# Patient Record
Sex: FEMALE | Race: WHITE | Employment: UNEMPLOYED | URBAN - METROPOLITAN AREA
[De-identification: names, ages, dates, MRNs, and addresses within clinical notes are randomized per-mention and may not be internally consistent; named-entity substitution may affect disease eponyms.]

---
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
## 2025-01-06 ENCOUNTER — OFFICE VISIT (OUTPATIENT)
Dept: FAMILY MEDICINE CLINIC | Age: 20
End: 2025-01-06
Payer: COMMERCIAL

## 2025-01-06 VITALS
DIASTOLIC BLOOD PRESSURE: 78 MMHG | BODY MASS INDEX: 29.78 KG/M2 | HEIGHT: 70 IN | WEIGHT: 208 LBS | TEMPERATURE: 98.6 F | SYSTOLIC BLOOD PRESSURE: 118 MMHG | HEART RATE: 118 BPM | OXYGEN SATURATION: 98 % | RESPIRATION RATE: 18 BRPM

## 2025-01-06 DIAGNOSIS — R10.32 LLQ ABDOMINAL PAIN: Primary | ICD-10-CM

## 2025-01-06 DIAGNOSIS — R30.0 DYSURIA: ICD-10-CM

## 2025-01-06 DIAGNOSIS — E86.0 DEHYDRATION: ICD-10-CM

## 2025-01-06 LAB
BILIRUBIN, POC: NORMAL
BLOOD URINE, POC: NORMAL
CLARITY, POC: CLEAR
COLOR, POC: YELLOW
CONTROL: NORMAL
GLUCOSE URINE, POC: NORMAL MG/DL
KETONES, POC: NORMAL MG/DL
LEUKOCYTE EST, POC: NORMAL
NITRITE, POC: NORMAL
PH, POC: 6
PREGNANCY TEST URINE, POC: NEGATIVE
PROTEIN, POC: 30 MG/DL
SPECIFIC GRAVITY, POC: >=1.03
UROBILINOGEN, POC: 0.2 MG/DL

## 2025-01-06 PROCEDURE — 1036F TOBACCO NON-USER: CPT | Performed by: NURSE PRACTITIONER

## 2025-01-06 PROCEDURE — 81025 URINE PREGNANCY TEST: CPT | Performed by: NURSE PRACTITIONER

## 2025-01-06 PROCEDURE — 99214 OFFICE O/P EST MOD 30 MIN: CPT | Performed by: NURSE PRACTITIONER

## 2025-01-06 PROCEDURE — G8427 DOCREV CUR MEDS BY ELIG CLIN: HCPCS | Performed by: NURSE PRACTITIONER

## 2025-01-06 PROCEDURE — 81002 URINALYSIS NONAUTO W/O SCOPE: CPT | Performed by: NURSE PRACTITIONER

## 2025-01-06 PROCEDURE — G8417 CALC BMI ABV UP PARAM F/U: HCPCS | Performed by: NURSE PRACTITIONER

## 2025-01-06 NOTE — PROGRESS NOTES
ACUTE PRIMARY CARE VISIT  25  Name: Makayla Jimenez   : 2005   Age: 19 y.o.  Sex: female   Chief Complaint   Patient presents with    GI Problem     HPI:     History of Present Illness  The patient is a 19-year-old female who presents for evaluation of gastrointestinal issues.    She reports experiencing difficulty with bowel movements, a lack of appetite, and persistent fatigue. She describes sharp abdominal pain followed by a sensation of gurgling in her back, typically resulting in the passage of gas during bathroom visits. These symptoms have been present for approximately one week, with a notable increase in severity yesterday. She has not been eating or drinking adequately and reports dark urine but no associated burning sensation. She has not taken any medication for her symptoms and has been managing them by resting and sleeping. She also reports tachycardia and tingling sensations. She does not experience nausea or vomiting. She still has her gallbladder and appendix. She has no history of ulcerative colitis or Crohn's disease. She experienced hot and cold sensations yesterday. She reports no possibility of pregnancy and is currently on birth control pills. She does not menstruate regularly.  Objective:     Vitals:    25 0845   BP: 118/78   Pulse: (!) 118   Resp: 18   Temp: 98.6 °F (37 °C)   SpO2: 98%   Weight: 94.3 kg (208 lb)   Height: 1.778 m (5' 10\")     Physical Exam  Active bowel sounds in the gastrointestinal system. Pain noted upon palpation.  Physical Exam  Vitals and nursing note reviewed.   Constitutional:       General: She is not in acute distress.     Appearance: Normal appearance. She is not ill-appearing.   HENT:      Head: Normocephalic and atraumatic.      Right Ear: External ear normal.      Left Ear: External ear normal.      Nose: Nose normal.   Eyes:      Conjunctiva/sclera: Conjunctivae normal.      Pupils: Pupils are equal, round, and reactive to light.

## 2025-01-08 LAB
CULTURE: NORMAL
SPECIMEN DESCRIPTION: NORMAL

## 2025-01-15 ENCOUNTER — TELEPHONE (OUTPATIENT)
Dept: CARDIOLOGY CLINIC | Age: 20
End: 2025-01-15

## 2025-01-15 NOTE — TELEPHONE ENCOUNTER
Patient Appointment Form:   scheduled from referral        PCP: Dr Osborne  Referring: Dr De La Garza     Has the Patient:     Seen a Cardiologist? Yes    date: 2022  Physician: DR Vivas  location: [facility]     Had a heart catheterization? No      Had heart surgery? No     Had a stress test or nuclear stress test? Yes   date: 2021   facility name: [facility]     Had an echocardiogram? Yes   date: 2021   facility name: [facility]    Had a vascular ultrasound? Yes     Had a 24/48 heart monitor or extended cardiac event monitor? Yes: extended cardiac event/MCOT monitor, other    date: 2021      Who ordered: [facility]     Had recent blood work in the last 6 months? No       Had a pacemaker/ICD/ILR implant? No     Seen an Electrophysiologist? No      Had a cardiac ablation?  No       Will send records via: in Epic        Date & time of appointment:  3/12/25 10:45am Dr Albarran

## 2025-01-29 ENCOUNTER — TELEPHONE (OUTPATIENT)
Dept: CARDIOLOGY CLINIC | Age: 20
End: 2025-01-29

## 2025-01-29 NOTE — TELEPHONE ENCOUNTER
Pt's grandmother called in she wanted to know if the pt can be seen sooner than 3/12/25, she went to [facility] on 1/28/25 her heart rate was 180, ED advised her to call to see if can be seen sooner.  Sarah can be reached at [phone].

## 2025-02-13 ENCOUNTER — OFFICE VISIT (OUTPATIENT)
Dept: CARDIOLOGY CLINIC | Age: 20
End: 2025-02-13
Payer: COMMERCIAL

## 2025-02-13 VITALS
RESPIRATION RATE: 16 BRPM | TEMPERATURE: 98.4 F | BODY MASS INDEX: 30.21 KG/M2 | HEIGHT: 70 IN | OXYGEN SATURATION: 98 % | HEART RATE: 98 BPM | DIASTOLIC BLOOD PRESSURE: 68 MMHG | SYSTOLIC BLOOD PRESSURE: 116 MMHG | WEIGHT: 211 LBS

## 2025-02-13 DIAGNOSIS — R00.0 TACHYCARDIA: Primary | ICD-10-CM

## 2025-02-13 DIAGNOSIS — R00.2 PALPITATIONS: ICD-10-CM

## 2025-02-13 DIAGNOSIS — I95.1 ORTHOSTATIC INTOLERANCE: ICD-10-CM

## 2025-02-13 PROCEDURE — 93000 ELECTROCARDIOGRAM COMPLETE: CPT | Performed by: INTERNAL MEDICINE

## 2025-02-13 PROCEDURE — 1036F TOBACCO NON-USER: CPT | Performed by: INTERNAL MEDICINE

## 2025-02-13 PROCEDURE — 99204 OFFICE O/P NEW MOD 45 MIN: CPT | Performed by: INTERNAL MEDICINE

## 2025-02-13 PROCEDURE — G8417 CALC BMI ABV UP PARAM F/U: HCPCS | Performed by: INTERNAL MEDICINE

## 2025-02-13 PROCEDURE — G8427 DOCREV CUR MEDS BY ELIG CLIN: HCPCS | Performed by: INTERNAL MEDICINE

## 2025-02-13 NOTE — PROGRESS NOTES
Patient was seen today for a heart monitor placement ordered by Dr. BOSWELL    Monitor type:ZIO   Duration:7 Days   Serial number: [id number]    ERMIAS RAMIREZ MA

## 2025-02-13 NOTE — PROGRESS NOTES
OUTPATIENT CARDIOLOGY CONSULT    Name: Makayla Jimenez    Age: 19 y.o.    Date of Service: 2/13/2025    Reason for Consultation: Palpitations    Referring Physician: Destiny Osborne MD    History of Present Illness:  Makayla Jimenez is a 19 y.o. female who presents today for further evaluation of palpitations.  States history of a heart condition but \"no one has been able to find what it is.\"  Multiple cardiac evaluations including pediatric cardiology with multiple monitors in the past with no identified arrhythmias.  Apparently had a syncopal episode while playing basketball and 2022 was taken to [facility], was thought to have possibly had a panic attack according to ER notes.     Initially scheduled cardiology valuation for clearance for knee surgery, then in January went to [place] ER and reportedly heart rates were in the 170s to 180s, she was diagnosed with influenza A and was febrile.  She was given IV fluids.  No records are available to me.  No EKG is available.  It does not appear she was told she had an arrhythmia.    Notices heart rates is elevated mainly when she stands.  Sometimes some dizziness upon standing but resolved.  No further syncope.  Denies chest pain or shortness of breath.  She works 2 jobs.  Works as a  and at a .    Does not drink much water.  Does not drink caffeine or alcohol or use drugs.    Had a workup at [facility] in 2021, thought they were told something about a \"extra chamber\" but that they wanted to do a CT of the heart but she is allergic to contrast.    Review of Systems:  Complete review of systems otherwise negative except as described above.    Past Medical History:  Past Medical History:   Diagnosis Date    Migraine     Tachycardia        Past Surgical History:  Past Surgical History:   Procedure Laterality Date    ANTERIOR CRUCIATE LIGAMENT REPAIR      EYE SURGERY Bilateral     KNEE SURGERY Right 08/2021    TONSILLECTOMY Bilateral

## 2025-02-14 ENCOUNTER — TELEPHONE (OUTPATIENT)
Dept: CARDIOLOGY | Age: 20
End: 2025-02-14

## 2025-02-14 ENCOUNTER — TELEPHONE (OUTPATIENT)
Dept: CARDIOLOGY CLINIC | Age: 20
End: 2025-02-14

## 2025-02-14 DIAGNOSIS — I95.1 ORTHOSTATIC INTOLERANCE: Primary | ICD-10-CM

## 2025-02-14 NOTE — TELEPHONE ENCOUNTER
----- Message from Dr. Nithin Sequeira MD sent at 2/13/2025  4:12 PM EST -----  needs tilt table test

## 2025-02-14 NOTE — TELEPHONE ENCOUNTER
Called patient and left message to schedule echo    Electronically signed by Irma Casarez on 2/14/2025 at 8:52 AM

## 2025-02-17 DIAGNOSIS — R00.0 TACHYCARDIA: ICD-10-CM

## 2025-02-17 LAB
ANION GAP SERPL CALCULATED.3IONS-SCNC: 13 MMOL/L (ref 7–16)
BUN BLDV-MCNC: 14 MG/DL (ref 6–20)
CALCIUM SERPL-MCNC: 9.9 MG/DL (ref 8.6–10.2)
CHLORIDE BLD-SCNC: 106 MMOL/L (ref 98–107)
CO2: 23 MMOL/L (ref 22–29)
CREAT SERPL-MCNC: 1.2 MG/DL (ref 0.5–1)
GFR, ESTIMATED: 68 ML/MIN/1.73M2
GLUCOSE BLD-MCNC: 124 MG/DL (ref 74–99)
POTASSIUM SERPL-SCNC: 4.6 MMOL/L (ref 3.5–5)
SODIUM BLD-SCNC: 142 MMOL/L (ref 132–146)
T4 FREE: 1.1 NG/DL (ref 0.9–1.7)
TSH SERPL DL<=0.05 MIU/L-ACNC: 0.99 UIU/ML (ref 0.27–4.2)

## 2025-03-04 ENCOUNTER — HOSPITAL ENCOUNTER (OUTPATIENT)
Dept: CARDIOLOGY | Age: 20
Discharge: HOME | End: 2025-03-06
Attending: INTERNAL MEDICINE
Payer: COMMERCIAL

## 2025-03-04 VITALS
HEIGHT: 70 IN | WEIGHT: 211 LBS | BODY MASS INDEX: 30.21 KG/M2 | SYSTOLIC BLOOD PRESSURE: 116 MMHG | DIASTOLIC BLOOD PRESSURE: 68 MMHG

## 2025-03-04 DIAGNOSIS — R00.2 PALPITATIONS: ICD-10-CM

## 2025-03-04 PROCEDURE — 93306 TTE W/DOPPLER COMPLETE: CPT

## 2025-03-05 ENCOUNTER — TELEPHONE (OUTPATIENT)
Dept: CARDIOLOGY CLINIC | Age: 20
End: 2025-03-05

## 2025-03-05 DIAGNOSIS — R00.2 PALPITATIONS: ICD-10-CM

## 2025-03-05 LAB
ECHO AO ASC DIAM: 3.2 CM
ECHO AO ASCENDING AORTA INDEX: 1.5 CM/M2
ECHO AV AREA PEAK VELOCITY: 3.3 CM2
ECHO AV AREA VTI: 3.1 CM2
ECHO AV AREA/BSA PEAK VELOCITY: 1.5 CM2/M2
ECHO AV AREA/BSA VTI: 1.4 CM2/M2
ECHO AV CUSP MM: 2.1 CM
ECHO AV MEAN GRADIENT: 3 MMHG
ECHO AV MEAN VELOCITY: 0.8 M/S
ECHO AV PEAK GRADIENT: 5 MMHG
ECHO AV PEAK VELOCITY: 1.1 M/S
ECHO AV VELOCITY RATIO: 0.91
ECHO AV VTI: 24.6 CM
ECHO BSA: 2.17 M2
ECHO EST RA PRESSURE: 3 MMHG
ECHO LA DIAMETER INDEX: 1.73 CM/M2
ECHO LA DIAMETER: 3.7 CM
ECHO LA VOL A-L A2C: 42 ML (ref 22–52)
ECHO LA VOL A-L A4C: 43 ML (ref 22–52)
ECHO LA VOL MOD A2C: 39 ML (ref 22–52)
ECHO LA VOL MOD A4C: 39 ML (ref 22–52)
ECHO LA VOLUME AREA LENGTH: 45 ML
ECHO LA VOLUME INDEX A-L A2C: 20 ML/M2 (ref 16–34)
ECHO LA VOLUME INDEX A-L A4C: 20 ML/M2 (ref 16–34)
ECHO LA VOLUME INDEX AREA LENGTH: 21 ML/M2 (ref 16–34)
ECHO LA VOLUME INDEX MOD A2C: 18 ML/M2 (ref 16–34)
ECHO LA VOLUME INDEX MOD A4C: 18 ML/M2 (ref 16–34)
ECHO LV EF PHYSICIAN: 60 %
ECHO LV FRACTIONAL SHORTENING: 38 % (ref 28–44)
ECHO LV INTERNAL DIMENSION DIASTOLE INDEX: 2.24 CM/M2
ECHO LV INTERNAL DIMENSION DIASTOLIC: 4.8 CM (ref 3.9–5.3)
ECHO LV INTERNAL DIMENSION SYSTOLIC INDEX: 1.4 CM/M2
ECHO LV INTERNAL DIMENSION SYSTOLIC: 3 CM
ECHO LV ISOVOLUMETRIC RELAXATION TIME (IVRT): 69.2 MS
ECHO LV IVSD: 0.9 CM (ref 0.6–0.9)
ECHO LV IVSS: 1.4 CM
ECHO LV MASS 2D: 170.2 G (ref 67–162)
ECHO LV MASS INDEX 2D: 79.5 G/M2 (ref 43–95)
ECHO LV POSTERIOR WALL DIASTOLIC: 1.1 CM (ref 0.6–0.9)
ECHO LV POSTERIOR WALL SYSTOLIC: 1.6 CM
ECHO LV RELATIVE WALL THICKNESS RATIO: 0.46
ECHO LVOT AREA: 3.8 CM2
ECHO LVOT AV VTI INDEX: 0.8
ECHO LVOT DIAM: 2.2 CM
ECHO LVOT MEAN GRADIENT: 2 MMHG
ECHO LVOT PEAK GRADIENT: 4 MMHG
ECHO LVOT PEAK VELOCITY: 1 M/S
ECHO LVOT STROKE VOLUME INDEX: 35 ML/M2
ECHO LVOT SV: 74.8 ML
ECHO LVOT VTI: 19.7 CM
ECHO MV "A" WAVE DURATION: 87.7 MSEC
ECHO MV A VELOCITY: 0.35 M/S
ECHO MV AREA PHT: 2.9 CM2
ECHO MV AREA VTI: 3.1 CM2
ECHO MV E DECELERATION TIME (DT): 211.6 MS
ECHO MV E VELOCITY: 0.85 M/S
ECHO MV E/A RATIO: 2.43
ECHO MV LVOT VTI INDEX: 1.23
ECHO MV MAX VELOCITY: 0.9 M/S
ECHO MV MEAN GRADIENT: 1 MMHG
ECHO MV MEAN VELOCITY: 0.5 M/S
ECHO MV PEAK GRADIENT: 3 MMHG
ECHO MV PRESSURE HALF TIME (PHT): 75.2 MS
ECHO MV VTI: 24.3 CM
ECHO PULMONARY ARTERY END DIASTOLIC PRESSURE: 2 MMHG
ECHO PV MAX VELOCITY: 0.9 M/S
ECHO PV MEAN GRADIENT: 2 MMHG
ECHO PV MEAN VELOCITY: 0.7 M/S
ECHO PV PEAK GRADIENT: 4 MMHG
ECHO PV REGURGITANT MAX VELOCITY: 0.7 M/S
ECHO PV VTI: 22.8 CM
ECHO PVEIN A DURATION: 115.3 MS
ECHO PVEIN A VELOCITY: 0.3 M/S
ECHO PVEIN PEAK D VELOCITY: 0.6 M/S
ECHO PVEIN PEAK S VELOCITY: 0.4 M/S
ECHO PVEIN S/D RATIO: 0.7
ECHO RIGHT VENTRICULAR SYSTOLIC PRESSURE (RVSP): 9 MMHG
ECHO RV INTERNAL DIMENSION: 3.2 CM
ECHO RV TAPSE: 3 CM (ref 1.7–?)
ECHO TV REGURGITANT MAX VELOCITY: 1.22 M/S
ECHO TV REGURGITANT PEAK GRADIENT: 6 MMHG

## 2025-03-05 NOTE — TELEPHONE ENCOUNTER
----- Message from Dr. Nithin Sequeira MD sent at 3/5/2025  7:27 AM EST -----  Thyroid function normal.  Chemistries were normal except creatinine was slightly on the high side, unclear if she may have been a bit dehydrated.  Make sure staying well-hydrated.    Monitor showed overall normal rhythm with an average heart rate in the 80s.  Heart rate was high at times up to 180s but appears to be sinus tachycardia, no evidence of SVT or arrhythmias.  There were rare extra beats.  She did not report any symptoms.    Echo was done will be reviewing.    Tilt table test still pending, apparently needs to see EP first, scheduled in June.

## 2025-03-05 NOTE — TELEPHONE ENCOUNTER
Patient's grandmother Sarah was given patient's lab and monitor results per Dr. Sequeira. Patient will await Echo results per Dr. Sequeira. Once patient see's EP will await scheduling for Tilt table.

## 2025-03-16 ENCOUNTER — RESULTS FOLLOW-UP (OUTPATIENT)
Dept: CARDIOLOGY | Age: 20
End: 2025-03-16

## 2025-03-16 DIAGNOSIS — I95.1 ORTHOSTATIC INTOLERANCE: Primary | ICD-10-CM

## 2025-03-16 NOTE — RESULT ENCOUNTER NOTE
Echo looks good, structurally normal heart; normal pumping function and normal valves.  Will await tilt table perhaps she can be placed on a cancellation list so maybe she can be seen sooner by EP and tilt scheduled sooner, as of right now EP appt is in June.    Make sure staying well hydrated. If has not had repeat blood work with PCP then BMP should be repeat at some point to follow up kidney function.

## 2025-03-17 NOTE — TELEPHONE ENCOUNTER
Patient notified of echo results per Dr. Sequeira. Patient notified to stay well hydrated and will go for repeat BMP at some point per  Dr. Sequeira's recommendations. Patient will contact EP to see if she can be seen sooner.

## 2025-03-17 NOTE — TELEPHONE ENCOUNTER
----- Message from Dr. Nithin Sequeira MD sent at 3/16/2025  1:51 PM EDT -----  Echo looks good, structurally normal heart; normal pumping function and normal valves.  Will await tilt table perhaps she can be placed on a cancellation list so maybe she can be seen sooner by EP and tilt scheduled sooner, as of right now EP appt is in June.    Make sure staying well hydrated. If has not had repeat blood work with PCP then BMP should be repeat at some point to follow up kidney function.

## 2025-03-27 ENCOUNTER — OFFICE VISIT (OUTPATIENT)
Dept: FAMILY MEDICINE CLINIC | Age: 20
End: 2025-03-27
Payer: COMMERCIAL

## 2025-03-27 VITALS
HEIGHT: 70 IN | DIASTOLIC BLOOD PRESSURE: 78 MMHG | HEART RATE: 85 BPM | RESPIRATION RATE: 18 BRPM | WEIGHT: 220 LBS | OXYGEN SATURATION: 100 % | BODY MASS INDEX: 31.5 KG/M2 | SYSTOLIC BLOOD PRESSURE: 122 MMHG | TEMPERATURE: 98.1 F

## 2025-03-27 DIAGNOSIS — J06.9 VIRAL URI: Primary | ICD-10-CM

## 2025-03-27 DIAGNOSIS — I95.1 ORTHOSTATIC INTOLERANCE: ICD-10-CM

## 2025-03-27 DIAGNOSIS — J02.9 SORE THROAT: ICD-10-CM

## 2025-03-27 LAB
ANION GAP SERPL CALCULATED.3IONS-SCNC: 17 MMOL/L (ref 7–16)
BUN BLDV-MCNC: 15 MG/DL (ref 6–20)
CALCIUM SERPL-MCNC: 9.9 MG/DL (ref 8.6–10.2)
CHLORIDE BLD-SCNC: 101 MMOL/L (ref 98–107)
CO2: 22 MMOL/L (ref 22–29)
CREAT SERPL-MCNC: 1 MG/DL (ref 0.5–1)
GFR, ESTIMATED: 84 ML/MIN/1.73M2
GLUCOSE BLD-MCNC: 93 MG/DL (ref 74–99)
POTASSIUM SERPL-SCNC: 4.2 MMOL/L (ref 3.5–5)
S PYO AG THROAT QL: NORMAL
SODIUM BLD-SCNC: 140 MMOL/L (ref 132–146)

## 2025-03-27 PROCEDURE — G8417 CALC BMI ABV UP PARAM F/U: HCPCS

## 2025-03-27 PROCEDURE — G8427 DOCREV CUR MEDS BY ELIG CLIN: HCPCS

## 2025-03-27 PROCEDURE — 99213 OFFICE O/P EST LOW 20 MIN: CPT

## 2025-03-27 PROCEDURE — 1036F TOBACCO NON-USER: CPT

## 2025-03-27 PROCEDURE — 87880 STREP A ASSAY W/OPTIC: CPT

## 2025-03-27 RX ORDER — BROMPHENIRAMINE MALEATE, PSEUDOEPHEDRINE HYDROCHLORIDE, AND DEXTROMETHORPHAN HYDROBROMIDE 2; 30; 10 MG/5ML; MG/5ML; MG/5ML
5 SYRUP ORAL 4 TIMES DAILY PRN
Qty: 118 ML | Refills: 0 | Status: SHIPPED
Start: 2025-03-27 | End: 2025-03-28

## 2025-03-27 NOTE — PROGRESS NOTES
10\")     Oxygen Saturation Interpretation: Normal.    Constitutional:  Alert, development consistent with age.  Ears:  TMs without perforation, injection, or bulging.  External canals clear without exudate.  Nose: Nasal congestion, negative sinus tenderness bilaterally.  Clear rhinorrhea.  Throat: Airway patent.  Posterior pharynx with erythema and 2+ tonsillar hypertrophy.  No exudate noted.    Neck:  Supple with good ROM. There is no anterior bilateral adenopathy.    Lungs:  Clear to auscultation and breath sounds equal.    CV: Regular rate and rhythm, normal heart sounds, without pathological murmurs, ectopy, gallops, or rubs.  Skin:  No rashes, erythema present.  Lymphatics: No lymphangitis or adenopathy noted other then stated above.  Neurological:  Alert and orientated.  Motor functions intact.  Responds to commands.     Test Results Section   (All laboratory and radiology results have been personally reviewed by myself)  Labs:  Results for orders placed or performed in visit on 03/27/25   POCT rapid strep A   Result Value Ref Range    Strep A Ag None Detected None Detected       Imaging:  All Radiology results interpreted by Radiologist unless otherwise noted.  No results found.    Assessment / Plan     Impression(s):  Makayla was seen today for pharyngitis.    Diagnoses and all orders for this visit:    Viral URI  -     POCT rapid strep A  -     brompheniramine-pseudoephedrine-DM 2-30-10 MG/5ML syrup; Take 5 mLs by mouth 4 times daily as needed for Congestion or Cough    Sore throat  -     POCT rapid strep A  -     brompheniramine-pseudoephedrine-DM 2-30-10 MG/5ML syrup; Take 5 mLs by mouth 4 times daily as needed for Congestion or Cough          Patient declined COVID and flu testing today.  Rapid strep came back negative. Pt advised that their symptoms are likely a viral illness and should resolve with time and conservative measures. Script written for Bromfed, side effects discussed. Increase fluids and

## 2025-03-28 ENCOUNTER — OFFICE VISIT (OUTPATIENT)
Dept: NEUROLOGY | Age: 20
End: 2025-03-28
Payer: COMMERCIAL

## 2025-03-28 VITALS
DIASTOLIC BLOOD PRESSURE: 78 MMHG | HEIGHT: 70 IN | SYSTOLIC BLOOD PRESSURE: 120 MMHG | BODY MASS INDEX: 31.5 KG/M2 | WEIGHT: 220 LBS

## 2025-03-28 DIAGNOSIS — G43.009 MIGRAINE WITHOUT AURA AND WITHOUT STATUS MIGRAINOSUS, NOT INTRACTABLE: Primary | ICD-10-CM

## 2025-03-28 PROCEDURE — G8417 CALC BMI ABV UP PARAM F/U: HCPCS | Performed by: PHYSICIAN ASSISTANT

## 2025-03-28 PROCEDURE — 99214 OFFICE O/P EST MOD 30 MIN: CPT | Performed by: PHYSICIAN ASSISTANT

## 2025-03-28 PROCEDURE — 1036F TOBACCO NON-USER: CPT | Performed by: PHYSICIAN ASSISTANT

## 2025-03-28 PROCEDURE — G8427 DOCREV CUR MEDS BY ELIG CLIN: HCPCS | Performed by: PHYSICIAN ASSISTANT

## 2025-03-28 RX ORDER — TOPIRAMATE 25 MG/1
25 TABLET, FILM COATED ORAL NIGHTLY
Qty: 30 TABLET | Refills: 2 | Status: SHIPPED | OUTPATIENT
Start: 2025-03-28 | End: 2025-06-26

## 2025-03-28 NOTE — PROGRESS NOTES
[facility] neurology  Office visit- follow up   Date:  3/28/2025  Patient Name:  Makayla Jimenez  YOB: 2005  MRN: [id number]     PCP:  Destiny Osborne MD   Referring:  No ref. provider found      Chief Complaint: Headache    History obtained from: Patient and grandmother    Assessment    Migraine headache with occasional aura occurring 6 to 8 days/month described as unilateral, throbbing headache with associated photophobia, nausea and has had episodes of blurred vision, left facial numbness and hearing loss.  Family history of migraines in her mother and grandmother.  Initially good reduction with Pamelor 10 mg nightly and has about 2-3 migraines per month, this was increased to 20 with no additional reduction  and increase following mild concussion-- she would like to try something different and will d/c Pamelor and switch to Topamax 25 mg nightly.     Plan  Wean Pamelor   Topamax 25 mg nightly- titrate based on response and tolerability   Sumatriptan 100 mg as needed  Headache diary  Lifestyle modifications to include increased water intake  Return to office in 3 months or sooner as needed        History of Present Illness:  Makayla Jimenez is a 19 y.o. right handed female presenting for evaluation of headaches.    She presents with her grandmother.  They are both good historians.    History is significant for exercise-induced tachycardia.  Otherwise, no significant medical history.    Patient has had headaches since she was a young child.  They have gradually worsened over the last 1 to 2 years and are occurring approximately 1 time per week but can last up to 3 days.  She describes both \"headaches\" that are more tension related, dull in nature and less severe.  She also reports \"migraines\" that are usually unilateral and vary in location.  These headaches are throbbing, pounding with associated photophobia, occasional nausea.  She usually has to lay down in a dark room and go to sleep.  She

## 2025-04-08 ENCOUNTER — OFFICE VISIT (OUTPATIENT)
Dept: ENT CLINIC | Age: 20
End: 2025-04-08
Payer: COMMERCIAL

## 2025-04-08 VITALS
SYSTOLIC BLOOD PRESSURE: 109 MMHG | TEMPERATURE: 98.4 F | BODY MASS INDEX: 31.21 KG/M2 | WEIGHT: 218 LBS | DIASTOLIC BLOOD PRESSURE: 78 MMHG | OXYGEN SATURATION: 97 % | HEART RATE: 97 BPM | RESPIRATION RATE: 16 BRPM | HEIGHT: 70 IN

## 2025-04-08 DIAGNOSIS — J32.4 CHRONIC PANSINUSITIS: ICD-10-CM

## 2025-04-08 DIAGNOSIS — J34.89 NASAL SEPTAL SPUR: ICD-10-CM

## 2025-04-08 DIAGNOSIS — J34.3 HYPERTROPHY OF NASAL TURBINATES: Primary | ICD-10-CM

## 2025-04-08 DIAGNOSIS — J34.2 DEVIATED NASAL SEPTUM: ICD-10-CM

## 2025-04-08 DIAGNOSIS — J34.89 CONCHA BULLOSA: ICD-10-CM

## 2025-04-08 PROCEDURE — 99213 OFFICE O/P EST LOW 20 MIN: CPT

## 2025-04-08 PROCEDURE — G8427 DOCREV CUR MEDS BY ELIG CLIN: HCPCS

## 2025-04-08 PROCEDURE — G8417 CALC BMI ABV UP PARAM F/U: HCPCS

## 2025-04-08 PROCEDURE — 1036F TOBACCO NON-USER: CPT

## 2025-04-08 ASSESSMENT — ENCOUNTER SYMPTOMS
SINUS PRESSURE: 0
EYE DISCHARGE: 0
ALLERGIC/IMMUNOLOGIC NEGATIVE: 1
BACK PAIN: 0
SORE THROAT: 0
COUGH: 0
SHORTNESS OF BREATH: 0
DIARRHEA: 0
EYE PAIN: 0
VOMITING: 0
RHINORRHEA: 0

## 2025-04-08 NOTE — PROGRESS NOTES
double vision.  The optic nerve can be injured during sphenoid sinus surgery.   Injury to the Nasolacrimal Duct can cause tearing.  -- Bleeding or air in the orbit. If this happens, blindness can occur due to pressure. Relief of the pressure is mandatory to prevent loss of sight.     Septoplasty  (nasal septum and turbinate surgery)        Surgical risks include:    -- Recurrence of the septal deviation with recurrence of the airway obstruction. The nasal septum is made of cartilage. Cartilage has a memory and sometimes over the course of hours will bend back to the preoperative position.  -- Septal Hematoma: This can occur if a drainage hole was not created in the septum. (Note: most surgeons have the problem of too many holes created during surgery, not too few). The cartilage has no blood supply and receives its nutrients from the overlying mucosa. A septal hematoma elevates the mucosal flaps off of the cartilage, resulting in cartilage death and usually infection. If the entire septum is lost the nose may collapse creating a saddle deformity.   -- Hole in the nasal septum. This occurs where there are two opposing holes in the nasal septal flaps. A hole in the nasal septum may result in disturbing crusting, bleeding and whistling.   -- Saddle Nose: If too much supporting cartilage is removed, the mid-portion of the nose may sag creating a \"Saddle Nose\" deformity.      The patient will be scheduled for surgical intervention with Dr. Sandra.  I would like her to continue to obtain cardiac clearance at this time.  They will then follow-up postoperatively for evaluation and further treatment planning.  Patient and family verbalized understanding and were in agreement to this plan.  They were instructed to call for any new or worsening symptoms prior to the next encounter.    Follow up 1 weeks after surgery    RX given today:  none

## 2025-04-16 ENCOUNTER — RESULTS FOLLOW-UP (OUTPATIENT)
Dept: CARDIOLOGY | Age: 20
End: 2025-04-16

## 2025-04-17 NOTE — TELEPHONE ENCOUNTER
Patient notified of improved lab per Dr. Sequeira. Patient notified to stay well hydrated per Dr. Sequeira's recommendation.

## 2025-06-30 ENCOUNTER — OFFICE VISIT (OUTPATIENT)
Dept: NON INVASIVE DIAGNOSTICS | Age: 20
End: 2025-06-30
Payer: COMMERCIAL

## 2025-06-30 VITALS
OXYGEN SATURATION: 98 % | TEMPERATURE: 97.5 F | WEIGHT: 225.4 LBS | DIASTOLIC BLOOD PRESSURE: 66 MMHG | SYSTOLIC BLOOD PRESSURE: 120 MMHG | RESPIRATION RATE: 16 BRPM | BODY MASS INDEX: 32.27 KG/M2 | HEIGHT: 70 IN | HEART RATE: 76 BPM

## 2025-06-30 DIAGNOSIS — I95.1 ORTHOSTATIC INTOLERANCE: Primary | ICD-10-CM

## 2025-06-30 DIAGNOSIS — Z01.818 PRE-OP TESTING: Primary | ICD-10-CM

## 2025-06-30 PROCEDURE — 93000 ELECTROCARDIOGRAM COMPLETE: CPT | Performed by: STUDENT IN AN ORGANIZED HEALTH CARE EDUCATION/TRAINING PROGRAM

## 2025-06-30 PROCEDURE — 99205 OFFICE O/P NEW HI 60 MIN: CPT | Performed by: STUDENT IN AN ORGANIZED HEALTH CARE EDUCATION/TRAINING PROGRAM

## 2025-06-30 PROCEDURE — G8427 DOCREV CUR MEDS BY ELIG CLIN: HCPCS | Performed by: STUDENT IN AN ORGANIZED HEALTH CARE EDUCATION/TRAINING PROGRAM

## 2025-06-30 PROCEDURE — 1036F TOBACCO NON-USER: CPT | Performed by: STUDENT IN AN ORGANIZED HEALTH CARE EDUCATION/TRAINING PROGRAM

## 2025-06-30 PROCEDURE — G8417 CALC BMI ABV UP PARAM F/U: HCPCS | Performed by: STUDENT IN AN ORGANIZED HEALTH CARE EDUCATION/TRAINING PROGRAM

## 2025-06-30 NOTE — PATIENT INSTRUCTIONS
You will be scheduled for tilt table test after your urine pregnancy test rules out active pregnancy.  Follow-up will be arranged with this office if needed based on test result.

## 2025-06-30 NOTE — PROGRESS NOTES
[facility] CARDIOLOGY  CARDIAC ELECTROPHYSIOLOGY DEPARTMENT/DIVISION OF CARDIOLOGY  Outpatient Consultation Report  PATIENT: Makayla Jimenez  MEDICAL RECORD NUMBER: [id number]  DATE OF SERVICE:  2025  ATTENDING ELECTROPHYSIOLOGIST:  Jimy Garrison DO  REFERRING PHYSICIAN: Nithin Sequeira MD and Destiny Osborne MD  CHIEF COMPLAINT: orthostatic hypotension    HPI: Makayla Jimenez is a 19 y.o. female with a history of migraine HA. She is managed by Dr Sequeira with no cardiac medications. In ~, she developed symptoms of dizziness/palpitations with position change from seated to standing. She was evaluated by [facility]. 24 hour Holter reported symptoms during sinus tachycardia at 131 - 143 bpm and mean HR of 84 bpm. TTE was grossly normal. Exercise stress test was grossly normal, but patient reported palpitations/chest discomfort during sinus tachycardia. In 2025, she was referred to Cardiology for pre-op evaluation (sinus surgery). Event monitor was WNL. No patient events. TTE was grossly normal. She was diagnosed with dehydration and instructed to hydrate. In the past, she reports multiple ED evaluations where she is diagnosed with dehydration and treated with IVF. She reportedly is having recurrent nausea/vomiting only when drinking water, which limits her hydration. She was referred to GI at [facility], who recommended PPI and imaging studies, which is currently pending. She presents today, 2025; as a referral to my office for further evaluation/management of dizziness/palpitations. She reportedly drinks < 8 fl oz of water daily. She does drink juice/gatorade. She denies any other complaints at this time.    Prior Cardiac Testin-day event monitor (2025): Sinus at  bpm (mean: 88 bpm), no patient events, SVE burden <1%, VE burden <1%, and no AF, SVT, VT, AV block, or significant pauses.  TTE: 3/4/2025: LVEF 60-65%, Left ventricle

## 2025-07-11 ENCOUNTER — TELEPHONE (OUTPATIENT)
Dept: NON INVASIVE DIAGNOSTICS | Age: 20
End: 2025-07-11

## 2025-07-11 NOTE — TELEPHONE ENCOUNTER
Per Dr Garrison schedule patient for TTT, with negative pregnancy test with in 2 weeks of procedure.